# Patient Record
Sex: MALE | Race: WHITE | Employment: STUDENT | URBAN - METROPOLITAN AREA
[De-identification: names, ages, dates, MRNs, and addresses within clinical notes are randomized per-mention and may not be internally consistent; named-entity substitution may affect disease eponyms.]

---
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
## 2022-10-14 NOTE — LETTER
Date & Time: 2/18/2019, 11:58 AM  Patient: Mike Astorga  Encounter Provider(s):    Priyanka Anderson MD       To Whom It May Concern:    Mike Astorga was seen and treated in our department on 2/18/2019. He can return to sports and gym after 1 week.     If you Outside order, specimen handled. No venipuncture.

## (undated) NOTE — ED AVS SNAPSHOT
Alecia Quinones   MRN: [id number]    Department:  [facility] Emergency Department   Date of Visit:  2/18/2019           Disclosure     Insurance plans vary and the physician(s) referred by the ER may not be covered by your plan.  Please contact your in tell this physician (or your personal doctor if your instructions are to return to your personal doctor) about any new or lasting problems. The primary care or specialist physician will see patients referred from the [facility] Emergency Department.  Susie Sanchez

## (undated) NOTE — ED AVS SNAPSHOT
[facility] Emergency Department    [place]  [address]    [address] [place] [address]    Phone:  [phone]    Fax:  [phone]           Alecia Quinones   MRN: [id number]    Department:  [facility] Emergency Department   Date of Visit:  2/23/201 IF THERE IS ANY CHANGE OR WORSENING OF YOUR CONDITION, CALL YOUR PRIMARY CARE PHYSICIAN AT ONCE OR RETURN IMMEDIATELY TO THE EMERGENCY DEPARTMENT.     If you have been prescribed any medication(s), please fill your prescription right away and begin taking t

## (undated) NOTE — LETTER
February 23, 2017    Patient: Clemetine Collet   Date of Visit: 2/23/2017       To Whom It May Concern:    Clemetine Collet was seen and treated in our emergency department on 2/23/2017. He may return to school with no contact sports or gym for 2 weeks.     If yo

## (undated) NOTE — ED AVS SNAPSHOT
[facility] Emergency Department    [place]  [address] [address]    Phone:  [phone]    Fax:  [phone]           Jacquesmartha Michelle   MRN: [id number]    Department:  [facility] Emergency Department   Date of Visit:  2/23/201 If you have any problems with your follow-up, please call our  at [phone]    Si usted tiene algun problema con alberto sequimiento, por favor llame a nuestro adminstrador de casos al [phone]    Expect to receive an electronic request Keya Hazel [address] [facility] ([address]) [address] [facility]. ([address]) [address]  [address] [facility] INDICATIONS:  fell into fence trying to catch football     PATIENT STATED HISTORY:  Patient provided no additional information         Findings:  C1-C7 are adequately visualized. No evidence of fracture or dislocation.    Alignment is normal.  Prevertebral

## (undated) NOTE — LETTER
Date: 9/13/2017    Patient Name: Latoya Powell          To Whom it may concern:     This letter has been written at the patient's request. The above patient was seen at the [facility] for treatment of a concussion sustained approximately 2 weeks